# Patient Record
Sex: MALE | URBAN - METROPOLITAN AREA
[De-identification: names, ages, dates, MRNs, and addresses within clinical notes are randomized per-mention and may not be internally consistent; named-entity substitution may affect disease eponyms.]

---

## 2017-01-01 ENCOUNTER — TRANSFERRED RECORDS (OUTPATIENT)
Dept: HEALTH INFORMATION MANAGEMENT | Facility: CLINIC | Age: 47
End: 2017-01-01

## 2017-01-11 ENCOUNTER — TRANSFERRED RECORDS (OUTPATIENT)
Dept: HEALTH INFORMATION MANAGEMENT | Facility: CLINIC | Age: 47
End: 2017-01-11

## 2017-01-31 PROCEDURE — 00000346 ZZHCL STATISTIC REVIEW OUTSIDE SLIDES TC 88321: Performed by: UROLOGY

## 2017-02-10 LAB — COPATH REPORT: NORMAL

## 2017-02-17 ENCOUNTER — TELEPHONE (OUTPATIENT)
Dept: UROLOGY | Facility: CLINIC | Age: 47
End: 2017-02-17

## 2017-02-17 NOTE — TELEPHONE ENCOUNTER
Returning pts phone call with questions re his appt coming up next week. Left a message to call me back.   Tamar Puga RN

## 2017-02-23 ENCOUNTER — PRE VISIT (OUTPATIENT)
Dept: UROLOGY | Facility: CLINIC | Age: 47
End: 2017-02-23

## 2017-03-13 ENCOUNTER — PRE VISIT (OUTPATIENT)
Dept: UROLOGY | Facility: CLINIC | Age: 47
End: 2017-03-13

## 2017-03-16 ENCOUNTER — OFFICE VISIT (OUTPATIENT)
Dept: UROLOGY | Facility: CLINIC | Age: 47
End: 2017-03-16

## 2017-03-16 VITALS
HEART RATE: 90 BPM | BODY MASS INDEX: 44.03 KG/M2 | DIASTOLIC BLOOD PRESSURE: 82 MMHG | WEIGHT: 274 LBS | SYSTOLIC BLOOD PRESSURE: 128 MMHG | HEIGHT: 66 IN

## 2017-03-16 DIAGNOSIS — C64.2 RENAL CELL CANCER, LEFT (H): Primary | ICD-10-CM

## 2017-03-16 RX ORDER — HYDROMORPHONE HYDROCHLORIDE 2 MG/1
2 TABLET ORAL
COMMUNITY

## 2017-03-16 RX ORDER — DAPAGLIFLOZIN 10 MG/1
10 TABLET, FILM COATED ORAL
COMMUNITY
Start: 2016-02-15

## 2017-03-16 RX ORDER — TRAMADOL HYDROCHLORIDE 50 MG/1
50 TABLET ORAL
COMMUNITY

## 2017-03-16 RX ORDER — METFORMIN HCL 500 MG
500 TABLET, EXTENDED RELEASE 24 HR ORAL
COMMUNITY
Start: 2016-01-18

## 2017-03-16 RX ORDER — ARMODAFINIL 150 MG/1
150 TABLET ORAL
COMMUNITY

## 2017-03-16 RX ORDER — ATORVASTATIN CALCIUM 40 MG/1
40 TABLET, FILM COATED ORAL
COMMUNITY
Start: 2016-01-18

## 2017-03-16 RX ORDER — GLYBURIDE 2.5 MG/1
5 TABLET ORAL
COMMUNITY

## 2017-03-16 RX ORDER — LIRAGLUTIDE 6 MG/ML
INJECTION SUBCUTANEOUS
COMMUNITY
Start: 2016-11-01

## 2017-03-16 RX ORDER — GLIMEPIRIDE 4 MG/1
4 TABLET ORAL
COMMUNITY

## 2017-03-16 RX ORDER — DOCUSATE SODIUM 100 MG/1
100 CAPSULE, LIQUID FILLED ORAL
COMMUNITY

## 2017-03-16 RX ORDER — OLMESARTAN MEDOXOMIL AND HYDROCHLOROTHIAZIDE 20/12.5 20; 12.5 MG/1; MG/1
1 TABLET ORAL
COMMUNITY

## 2017-03-16 RX ORDER — ZOLPIDEM TARTRATE 5 MG/1
5 TABLET ORAL
COMMUNITY

## 2017-03-16 RX ORDER — OLMESARTAN MEDOXOMIL 20 MG/1
20 TABLET ORAL
COMMUNITY

## 2017-03-16 RX ORDER — TAMSULOSIN HYDROCHLORIDE 0.4 MG/1
0.4 CAPSULE ORAL
COMMUNITY
Start: 2015-11-06

## 2017-03-16 ASSESSMENT — ENCOUNTER SYMPTOMS
DEPRESSION: 1
PANIC: 1
DECREASED CONCENTRATION: 1
INSOMNIA: 1
NERVOUS/ANXIOUS: 1

## 2017-03-16 ASSESSMENT — PAIN SCALES - GENERAL: PAINLEVEL: NO PAIN (0)

## 2017-03-16 NOTE — MR AVS SNAPSHOT
After Visit Summary   3/16/2017    Laureano Medellin II    MRN: [id number]           Patient Information     Date Of Birth          1970        Visit Information        Provider Department      3/16/2017 10:00 AM Delta Piña MD [facility] Urology and [facility]        Care Instructions    Follow up with Dr. Piña after second opinion.    It was a pleasure meeting with you today.  Thank you for allowing me and my team the privilege of caring for you today.  YOU are the reason we are here, and I truly hope we provided you with the excellent service you deserve.  Please let us know if there is anything else we can do for you so that we can be sure you are leaving completely satisfied with your care experience.      Leia Escobar DANIEL        Follow-ups after your visit        Who to contact     Please call your clinic at [phone] to:    Ask questions about your health    Make or cancel appointments    Discuss your medicines    Learn about your test results    Speak to your doctor   If you have compliments or concerns about an experience at your clinic, or if you wish to file a complaint, please contact [facility] Patient Relations at [phone] or email us at [email or web address]         Additional Information About Your Visit        MyChart Information     [employer] is an electronic gateway that provides easy, online access to your medical records. With [employer], you can request a clinic appointment, read your test results, renew a prescription or communicate with your care team.     To sign up for [employer] visit the website at [email or web address]   You will be asked to enter the access code listed below, as well as some personal information. Please follow the directions to create your username and password.     Your access code is: [email or web address]  Expires: 2017  7:30 AM     Your access code will  in 90  "days. If you need help or a new code, please contact your [facility] or call [phone] for assistance.        Care EveryWhere ID     This is your Care EveryWhere ID. This could be used by other organizations to access your [place] medical records  [id number]        Your Vitals Were     Pulse Height BMI (Body Mass Index)             90 1.676 m (5' 6\") 44.22 kg/m2          Blood Pressure from Last 3 Encounters:   03/16/17 128/82    Weight from Last 3 Encounters:   03/16/17 124.3 kg (274 lb)              Today, you had the following     No orders found for display       Primary Care Provider    None Specified       No primary provider on file.        Thank you!     Thank you for choosing [facility] UROLOGY AND [facility] PROST[facility] UROLOGIC CANCERS  for your care. Our goal is always to provide you with excellent care. Hearing back from our patients is one way we can continue to improve our services. Please take a few minutes to complete the written survey that you may receive in the mail after your visit with us. Thank you!             Your Updated Medication List - Protect others around you: Learn how to safely use, store and throw away your medicines at www.disposemymeds.org.          This list is accurate as of: 3/16/17 10:52 AM.  Always use your most recent med list.                   Brand Name Dispense Instructions for use    armodafinil 150 MG Tabs tablet    NUVIGIL     Take 150 mg by mouth       atorvastatin 40 MG tablet    LIPITOR     Take 40 mg by mouth       dapagliflozin 10 MG Tabs tablet    FARXIGA     Take 10 mg by mouth       docusate sodium 100 MG capsule    COLACE     Take 100 mg by mouth       FLUoxetine 20 MG capsule    PROzac     Take 20 mg by mouth       glimepiride 4 MG tablet    AMARYL     Take 4 mg by mouth       glyBURIDE 2.5 MG tablet    DIABETA /MICRONASE     Take 5 mg by mouth       HYDROmorphone 2 MG tablet    DILAUDID     Take 2 mg by mouth       " metFORMIN 500 MG 24 hr tablet    GLUCOPHAGE-XR     Take 500 mg by mouth       olmesartan 20 MG tablet    BENICAR     Take 20 mg by mouth       olmesartan-hydrochlorothiazide 20-12.5 MG per tablet    BENICAR     Take 1 tablet by mouth       omeprazole 20 MG CR capsule    priLOSEC     TAKE ONE CAPSULE BY MOUTH EVERY DAY       sitagliptin 100 MG tablet    JANUVIA     Take 100 mg by mouth       tamsulosin 0.4 MG capsule    FLOMAX     Take 0.4 mg by mouth       traMADol 50 MG tablet    ULTRAM     Take 50 mg by mouth Reported on 3/16/2017       VICTOZA PEN 18 MG/3ML soln   Generic drug:  liraglutide      INJECT 1.2MG DAILY       zolpidem 5 MG tablet    AMBIEN     Take 5 mg by mouth

## 2017-03-16 NOTE — PROGRESS NOTES
Chief Complaint:   Multiple Bilateral Renal Mass           Consult or Referral:     Mr. Laureano Medellin II is a 46 year old male seen in consultation         History of Present Illness:    Laureano Medellin II is a very pleasant 46 year old male who presents with a history of bilateral renal masses.  Tested for VHL in the past, but was negative, but about 10 years ago had bilateral multiple renal masses removed in two separate operations by Dr. Logan when he was at [facility].  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  Unfortunately, he had a recurrence of his renal masses bilaterally and recently went an open sub costal to midline incision and had multiple complications.  There remain several smaller lesions that weren't treated because of the quantity of blood loss on the right side.  The left side has a 3.1 and 3.3 cm tumor as well as smaller tumors and some cysts.  SCr is normal.      ECOG Performance Score: 0  0=Fully active, 1=Unable to do strenuous activity but capable of light work, 2=Ambulatory and capable of all selfcare, 3=Capable of limited selfcare, confined to bed >50% of waking hours, 4=Completely disabled.           Past Medical History:     Past Medical History   Diagnosis Date     BPH (benign prostatic hyperplasia)      Diabetes mellitus (H)      GERD (gastroesophageal reflux disease)      HTN (hypertension)      Hyperlipidemia             Past Surgical History:     Past Surgical History   Procedure Laterality Date     Nephrectomy partial Right      Nephrectomy partial Left      Nephrectomy partial Right             Medications     Current Outpatient Prescriptions   Medication     armodafinil (NUVIGIL) 150 MG TABS tablet     atorvastatin (LIPITOR) 40 MG tablet     dapagliflozin (FARXIGA) 10 MG TABS tablet     docusate sodium (COLACE) 100 MG capsule     FLUoxetine (PROZAC) 20 MG capsule     glimepiride (AMARYL) 4 MG tablet     glyBURIDE (DIABETA /MICRONASE) 2.5 MG tablet      "HYDROmorphone (DILAUDID) 2 MG tablet     metFORMIN (GLUCOPHAGE-XR) 500 MG 24 hr tablet     olmesartan (BENICAR) 20 MG tablet     olmesartan-hydrochlorothiazide (BENICAR) 20-12.5 MG per tablet     omeprazole (PRILOSEC) 20 MG CR capsule     sitagliptin (JANUVIA) 100 MG tablet     tamsulosin (FLOMAX) 0.4 MG capsule     liraglutide (VICTOZA PEN) 18 MG/3ML soln     zolpidem (AMBIEN) 5 MG tablet     traMADol (ULTRAM) 50 MG tablet     No current facility-administered medications for this visit.             Family History:   No family history on file.         Social History:     Social History     Social History     Marital status:      Spouse name: N/A     Number of children: N/A     Years of education: N/A     Occupational History     Not on file.     Social History Main Topics     Smoking status: Former Smoker     Smokeless tobacco: Not on file     Alcohol use Not on file     Drug use: Not on file     Sexual activity: Not on file     Other Topics Concern     Not on file     Social History Narrative            Allergies:   Hydrocodone-acetaminophen and Metformin         Review of Systems:  From intake questionnaire     Answers for HPI/ROS submitted by the patient on 3/16/2017   General Symptoms: No  Skin Symptoms: No  HENT Symptoms: No  EYE SYMPTOMS: No  HEART SYMPTOMS: No  LUNG SYMPTOMS: No  INTESTINAL SYMPTOMS: No  URINARY SYMPTOMS: No  REPRODUCTIVE SYMPTOMS: No  SKELETAL SYMPTOMS: No  BLOOD SYMPTOMS: No  NERVOUS SYSTEM SYMPTOMS: No  MENTAL HEALTH SYMPTOMS: Yes  Nervous or Anxious: Yes  Depression: Yes  Trouble sleeping: Yes  Trouble thinking or concentrating: Yes  Mood changes: Yes  Panic attacks: Yes           Physical Exam:     Patient is a 46 year old  male   Vitals: Blood pressure 128/82, pulse 90, height 1.676 m (5' 6\"), weight 124.3 kg (274 lb).  General Appearance Adult: Alert, no acute distress, oriented  HENT: throat/mouth:normal, good dentition  Neck: No adenopathy,masses or thyromegaly  Lungs: no " respiratory distress, or pursed lip breathing  Heart: No obvious jugular venous distension present  Abdomen: soft, nontender, no organomegaly or masses, Body mass index is 44.22 kg/(m^2)., scars noted left and right flank incisions and right subcostal curving to midline.  Lymphatics: No cervical or supraclavicular adenopathy  Musculoskeltal: extremities normal, no peripheral edema  Skin: no suspicious lesions or rashes  Neuro: Alert, oriented, speech and mentation normal  Psych: affect and mood normal  Gait: Normal  : deferred      Labs and Pathology:    I reviewed all applicable laboratory and pathology data reviewed with patient  Significant for     No results found for: CR    Outside report shows a normal scr        Imaging:    I reviewed all applicable imaging and went over the results with the patient.  Significant for renal masses as above, planning to get repeat imaging at the [facility]      Outside and Past Medical records:    I spent 10 minutes reviewing outside and past medical records.           Assessment and Plan:     Assessment:  History of RCC with recurrent renal masses bilaterally, with the larger tumors resected on the right and some still on the left.    Plan:  Dr. Logan and I had a long discussion with the patient and his wife about the possibilities for treatment.  We discussed with genetic syndromes that generally we would watch until tumors approached or passed 3 cms and then go in and remove all the tumors on that side.    He has several tumors that are at or slightly above that threshold on the left, though imaging studies are old.  Will have the [facility] send us his updated imaging after their upcoming appointment.    Given the fact that the kidney was previously mobilized and the tumors are complex, would probably lean towards an open repeat partial nephrectomy, but could potentially consider a robotic approach, with a low threshold to switch to open    Could also consider targeted therapy.  He is  still rather tired from the recovery of his last operation and would like a break if at all possible prior to returning to surgery.    We went through several scenarios etc and they want to think about things and will get back with us after the visit at the [facility].    I spent over 45 minutes with the patient.  Over half this time was spent on counseling.      Delta Piña MD  Department of Urology Staff  [facility]  Patient Care Team:  Alex Logan MD as MD (Urology)  Carmencita Strickland, RN as Registered Nurse (Oncology)  SELF, REFERRED    Copy to patient  CARL GONZALEZ II  [address] [facility] [address]    [place] Reece [facility]

## 2017-03-16 NOTE — LETTER
3/16/2017       RE: Laureano Medellin II  [address] [facility] [address]     Dear Colleague,    Thank you for referring your patient, Laureano Medellin II, to the [facility] UROLOGY AND INST FOR PROSTATE AND UROLOGIC CANCERS at [facility]. Please see a copy of my visit note below.          Chief Complaint:   Multiple Bilateral Renal Mass           Consult or Referral:     Mr. Laureano Medellin II is a 46 year old male seen in consultation         History of Present Illness:    Laureano Medellin II is a very pleasant 46 year old male who presents with a history of bilateral renal masses.  Tested for VHL in the past, but was negative, but about 10 years ago had bilateral multiple renal masses removed in two separate operations by Dr. Logan when he was at [facility].  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  Unfortunately, he had a recurrence of his renal masses bilaterally and recently went an open sub costal to midline incision and had multiple complications.  There remain several smaller lesions that weren't treated because of the quantity of blood loss on the right side.  The left side has a 3.1 and 3.3 cm tumor as well as smaller tumors and some cysts.  SCr is normal.      ECOG Performance Score: 0  0=Fully active, 1=Unable to do strenuous activity but capable of light work, 2=Ambulatory and capable of all selfcare, 3=Capable of limited selfcare, confined to bed >50% of waking hours, 4=Completely disabled.           Past Medical History:     Past Medical History   Diagnosis Date     BPH (benign prostatic hyperplasia)      Diabetes mellitus (H)      GERD (gastroesophageal reflux disease)      HTN (hypertension)      Hyperlipidemia             Past Surgical History:     Past Surgical History   Procedure Laterality Date     Nephrectomy partial Right      Nephrectomy partial Left      Nephrectomy partial Right             Medications     Current Outpatient Prescriptions    Medication     armodafinil (NUVIGIL) 150 MG TABS tablet     atorvastatin (LIPITOR) 40 MG tablet     dapagliflozin (FARXIGA) 10 MG TABS tablet     docusate sodium (COLACE) 100 MG capsule     FLUoxetine (PROZAC) 20 MG capsule     glimepiride (AMARYL) 4 MG tablet     glyBURIDE (DIABETA /MICRONASE) 2.5 MG tablet     HYDROmorphone (DILAUDID) 2 MG tablet     metFORMIN (GLUCOPHAGE-XR) 500 MG 24 hr tablet     olmesartan (BENICAR) 20 MG tablet     olmesartan-hydrochlorothiazide (BENICAR) 20-12.5 MG per tablet     omeprazole (PRILOSEC) 20 MG CR capsule     sitagliptin (JANUVIA) 100 MG tablet     tamsulosin (FLOMAX) 0.4 MG capsule     liraglutide (VICTOZA PEN) 18 MG/3ML soln     zolpidem (AMBIEN) 5 MG tablet     traMADol (ULTRAM) 50 MG tablet     No current facility-administered medications for this visit.             Family History:   No family history on file.         Social History:     Social History     Social History     Marital status:      Spouse name: N/A     Number of children: N/A     Years of education: N/A     Occupational History     Not on file.     Social History Main Topics     Smoking status: Former Smoker     Smokeless tobacco: Not on file     Alcohol use Not on file     Drug use: Not on file     Sexual activity: Not on file     Other Topics Concern     Not on file     Social History Narrative            Allergies:   Hydrocodone-acetaminophen and Metformin         Review of Systems:  From intake questionnaire     Answers for HPI/ROS submitted by the patient on 3/16/2017   General Symptoms: No  Skin Symptoms: No  HENT Symptoms: No  EYE SYMPTOMS: No  HEART SYMPTOMS: No  LUNG SYMPTOMS: No  INTESTINAL SYMPTOMS: No  URINARY SYMPTOMS: No  REPRODUCTIVE SYMPTOMS: No  SKELETAL SYMPTOMS: No  BLOOD SYMPTOMS: No  NERVOUS SYSTEM SYMPTOMS: No  MENTAL HEALTH SYMPTOMS: Yes  Nervous or Anxious: Yes  Depression: Yes  Trouble sleeping: Yes  Trouble thinking or concentrating: Yes  Mood changes: Yes  Panic attacks:  "Yes           Physical Exam:     Patient is a 46 year old  male   Vitals: Blood pressure 128/82, pulse 90, height 1.676 m (5' 6\"), weight 124.3 kg (274 lb).  General Appearance Adult: Alert, no acute distress, oriented  HENT: throat/mouth:normal, good dentition  Neck: No adenopathy,masses or thyromegaly  Lungs: no respiratory distress, or pursed lip breathing  Heart: No obvious jugular venous distension present  Abdomen: soft, nontender, no organomegaly or masses, Body mass index is 44.22 kg/(m^2)., scars noted left and right flank incisions and right subcostal curving to midline.  Lymphatics: No cervical or supraclavicular adenopathy  Musculoskeltal: extremities normal, no peripheral edema  Skin: no suspicious lesions or rashes  Neuro: Alert, oriented, speech and mentation normal  Psych: affect and mood normal  Gait: Normal  : deferred      Labs and Pathology:    I reviewed all applicable laboratory and pathology data reviewed with patient  Significant for     No results found for: CR    Outside report shows a normal scr        Imaging:    I reviewed all applicable imaging and went over the results with the patient.  Significant for renal masses as above, planning to get repeat imaging at the [facility]      Outside and Past Medical records:    I spent 10 minutes reviewing outside and past medical records.           Assessment and Plan:     Assessment:  History of RCC with recurrent renal masses bilaterally, with the larger tumors resected on the right and some still on the left.    Plan:  Dr. Logan and I had a long discussion with the patient and his wife about the possibilities for treatment.  We discussed with genetic syndromes that generally we would watch until tumors approached or passed 3 cms and then go in and remove all the tumors on that side.    He has several tumors that are at or slightly above that threshold on the left, though imaging studies are old.  Will have the [facility] send us his updated imaging after " their upcoming appointment.    Given the fact that the kidney was previously mobilized and the tumors are complex, would probably lean towards an open repeat partial nephrectomy, but could potentially consider a robotic approach, with a low threshold to switch to open    Could also consider targeted therapy.  He is still rather tired from the recovery of his last operation and would like a break if at all possible prior to returning to surgery.    We went through several scenarios etc and they want to think about things and will get back with us after the visit at the [facility].    I spent over 45 minutes with the patient.  Over half this time was spent on counseling.      Delta Piña MD  Department of Urology Staff  [facility]        CC  Patient Care Team:  Alex Logan MD as MD (Urology)  Carmencita Strickland, RN as Registered Nurse (Oncology)  SELF, REFERRED    Copy to patient  CARL GONZALEZ II  [address] [facility] [address]    [place] Reece [facility]    Delta Piña MD

## 2017-03-16 NOTE — PATIENT INSTRUCTIONS
Follow up with Dr. Piña after second opinion.    It was a pleasure meeting with you today.  Thank you for allowing me and my team the privilege of caring for you today.  YOU are the reason we are here, and I truly hope we provided you with the excellent service you deserve.  Please let us know if there is anything else we can do for you so that we can be sure you are leaving completely satisfied with your care experience.      YADIEL Mendez